# Patient Record
Sex: FEMALE | Race: WHITE | Employment: STUDENT | URBAN - METROPOLITAN AREA
[De-identification: names, ages, dates, MRNs, and addresses within clinical notes are randomized per-mention and may not be internally consistent; named-entity substitution may affect disease eponyms.]

---
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
## 2023-06-12 ENCOUNTER — TELEPHONE (OUTPATIENT)
Dept: ORTHOPEDIC SURGERY | Facility: CLINIC | Age: 18
End: 2023-06-12
Payer: COMMERCIAL

## 2023-06-12 NOTE — TELEPHONE ENCOUNTER
UNABLE TO WT CLINICAL  NON [facility]     Caller: ADRIEN LEE    Relationship to patient: PATIENT    Best call back number: [phone]    Chief complaint: DISLOCATION RIGHT KNEE     Type of visit: NEW PATIENT     Requested date: ASAP      If rescheduling, when is the original appointment: N/A     Additional notes:PATIENT SEEN AT [facility] 06/01/23 AFTER INJURING RIGHT KNEE WHILE PLAYING KICK BALL  PATIENT SEEN AT [facility] 06/01/23    REFERRAL IN SCHEDULE REVIEW

## 2023-06-13 NOTE — TELEPHONE ENCOUNTER
Patient has been scheduled with Dr. Mckeon on Friday, June 16th at our [place] location and is aware of this.

## 2023-06-16 ENCOUNTER — OFFICE VISIT (OUTPATIENT)
Dept: ORTHOPEDIC SURGERY | Facility: CLINIC | Age: 18
End: 2023-06-16
Payer: COMMERCIAL

## 2023-06-16 VITALS — BODY MASS INDEX: 22.79 KG/M2 | TEMPERATURE: 97.1 F | WEIGHT: 150.4 LBS | HEIGHT: 68 IN

## 2023-06-16 DIAGNOSIS — S83.004A DISLOCATION OF RIGHT PATELLA, INITIAL ENCOUNTER: ICD-10-CM

## 2023-06-16 DIAGNOSIS — M25.561 RIGHT KNEE PAIN, UNSPECIFIED CHRONICITY: Primary | ICD-10-CM

## 2023-06-16 RX ORDER — CETIRIZINE HYDROCHLORIDE 5 MG/1
5 TABLET ORAL DAILY
COMMUNITY

## 2023-06-16 NOTE — PROGRESS NOTES
New Knee      Patient: Sharron Benson        YOB: 2005    Medical Record Number: [id number]        Chief Complaints: Right knee pain      History of Present Illness: This is a 18-year-old female who presents following a June 1 episode where her patella dislocated she was playing kickball and really states she was straightforward running when her kneecap dislocated.  This happened 1 year ago where her patella dislocated she had an MRI at that time which did show a nondisplaced patella fracture.  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     No current facility-administered medications on file prior to visit.     Current Medications:  Scheduled Meds:  Continuous Infusions:No current facility-administered medications for this visit.    PRN Meds:.    Past Medical History:   Diagnosis Date    Dislocated patella, right, sequela 04/01/2022    Treated in [place]    Right patella fracture 04/01/2022    Treated in [place]      History reviewed. No pertinent surgical history.     Social History     Occupational History    Not on file   Tobacco Use    Smoking status: Never    Smokeless tobacco: Never   Vaping Use    Vaping Use: Never used   Substance and Sexual Activity    Alcohol use: Defer    Drug use: Never    Sexual activity: Defer      Social History     Social History Narrative    Not on file      History reviewed. No pertinent family history.          Review of Systems:     Review of Systems      Physical Exam: 18 y.o. female  General Appearance:    Alert, cooperative, in no acute distress                   Vitals:    06/16/23 1048   Temp: 97.1 °F (36.2 °C)   TempSrc: Temporal   Weight: 68.2 kg (150 lb  "6.4 oz)   Height: 171.5 cm (67.5\")   PainSc:   3      Patient is alert and read ×3 no acute distress appears her above-listed at height weight and age.  Affect is normal respiratory rate is normal unlabored. Heart rate regular rate rhythm, sclera, dentition and hearing are normal for the purpose of this exam.        Ortho Exam exam of her right knee she does have a moderate effusion she does have mobility of the patella but no true apprehension sign but pain with mobility she is limited in range of motion due to her swelling    Procedures             Radiology:   AP, Lateral and merchant views of the right knee  were ordered/reviewed to evauateknee pain.  I have no comparative film you do see some calcification/ossification of the medial aspect of patella patella appears to sit centrally and she has a reasonable trochlear groove.  She also has an MRI from 1 year ago which I reviewed in epic and she did have a nondisplaced patella fracture  Imaging Results (Most Recent)       Procedure Component Value Units Date/Time    XR Knee 3 View Right [[id number]] Resulted: 06/16/23 1040     Updated: 06/16/23 1041    Impression:      Ordering physician's impression is located in the Encounter Note dated 06/16/23. X-ray performed in the DR room.            Assessment/Plan:    Right knee patella dislocation I think at this point we do need to repeat her MRI to really look at the fracture but also decide if this is something surgical she would like to head back home if it surgical which I understand.  We talked about the things that she can change including position of her feet she needs some arch supports to prevent pes planus and also she needs to be sick have significant strength in her quads and core                               "

## 2024-01-29 ENCOUNTER — HOSPITAL ENCOUNTER (OUTPATIENT)
Dept: PHYSICAL THERAPY | Age: 19
Setting detail: THERAPIES SERIES
Discharge: HOME | End: 2024-01-29
Payer: COMMERCIAL

## 2024-01-29 DIAGNOSIS — M25.661 DECREASED RANGE OF MOTION (ROM) OF RIGHT KNEE: Primary | ICD-10-CM

## 2024-01-29 DIAGNOSIS — R29.898 DECREASED STRENGTH INVOLVING KNEE JOINT: ICD-10-CM

## 2024-01-29 DIAGNOSIS — M25.561 ACUTE PAIN OF RIGHT KNEE: ICD-10-CM

## 2024-01-29 PROCEDURE — 97161 PT EVAL LOW COMPLEX 20 MIN: CPT

## 2024-01-29 NOTE — PLAN OF CARE
body structures, functions (impairments), activity limitations, and/or participation restrictions  [x] A clinical presentation with evolving clinical presentation with changing characteristics  [x] Clinical decision making of LOW (73369 - Typically 20 minutes face-to-face) complexity using standardized patient assessment instrument and/or measurable assessment of functional outcome.    GOALS     Patient stated goal: Patient will return to swimming with no functional limitations and 0/10 at end of plan of care  Status: [] Progressing: [] Met: [x] Not Met: [] Adjusted    Therapist goals for Patient:   Short Term Goals: To be achieved in: 2 weeks  Independent in HEP and progression per patient tolerance, in order to progress toward full function and prevent re-injury.    Status: [] Progressing: [] Met: [x] Not Met: [] Adjusted  Patient will have a decrease in pain to 0/10 to help  facilitate improvement in movement, function, and ADLs as indicated by functional deficits.   Status: [] Progressing: [] Met: [x] Not Met: [] Adjusted    Long Term Goals: To be achieved in:  8  weeks  Disability index score of 10% or less for the LEFS to assist with return top prior level of function.    Status: [] Progressing: [] Met: [x] Not Met: [] Adjusted  Improve  knee AROM  Flexion to 143 degrees or  better to allow for proper joint functioning as indicated by patients functional deficits.  Status: [] Progressing: [] Met: [x] Not Met: [] Adjusted  Pt to improve strength to show motor control/activation of quadriceps to facilitate functional mobility and ADLs.   Status: [] Progressing: [] Met: [x] Not Met: [] Adjusted  Patient will return to Usual work, housework or activities, Usual recreational activities, and walk 1 mile without increased symptoms or restriction to work towards return to prior level of function.  Status: [] Progressing: [] Met: [x] Not Met: [] Adjusted  Patient will increase LE function to allow independence in all

## 2024-01-29 NOTE — FLOWSHEET NOTE
[facility] - [facility] Therapy [address] [place] [id number] office: [phone] fax: [phone]      Physical Therapy: TREATMENT/PROGRESS NOTE   Patient: Renetta Sutherland (18 y.o. female)   Treatment Date: 2024   :  2005 MRN: [id number]   Visit #: 1   Insurance Allowable Auth Needed   Medically necessary []Yes    [x]No    Insurance: Payor: [employer] / Plan: [employer] STUDENT [employer] / Product Type: *No Product type* /   Insurance ID: [id number] - (Commercial)  Secondary Insurance (if applicable):    Treatment Diagnosis:     ICD-10-CM    1. Decreased range of motion (ROM) of right knee  M25.661       2. Decreased strength involving knee joint  R29.898       3. Acute pain of right knee  M25.561          Medical Diagnosis:    Recurrent dislocation, unspecified knee [M24.469]   Referring Physician: Alfredo Wu MD  PCP: No primary care provider on file.                             Plan of care signed: NO    Date of Patient follow up with Physician:      Progress Report/POC: EVAL today  POC update due: (10 visits /OR AUTH LIMITS, whichever is less)  2024     Protocol for right knee arthroscopy with Tayler tibial tubercle osteotomy and medial patellofemoral ligament reconstruction. Patient cleared for full WB as tolerated    Preferred Language for Healthcare:   [x]English       []other:    SUBJECTIVE EXAMINATION     Patient Report/Comments: see eval     Test used Initial score  2024   Pain Summary VAS 3/10    Functional questionnaire LEFS 27/80    Other:                OBJECTIVE EXAMINATION     Observation: increased swelling in R knee compared to Left knee. Hypomobility noted in R knee for medial, superior, and inferior patellar glides    Test measurements: see eval    Exercises/Interventions:     Therapeutic Ex (27933)  resistance Sets/time Reps Notes/Cues/Progressions   Straight leg raise  1 10 HEP   Long arc quads  1 10 5 second hold, HEP

## 2024-02-01 ENCOUNTER — HOSPITAL ENCOUNTER (OUTPATIENT)
Dept: PHYSICAL THERAPY | Age: 19
Setting detail: THERAPIES SERIES
Discharge: HOME | End: 2024-02-01
Payer: COMMERCIAL

## 2024-02-01 PROCEDURE — 97110 THERAPEUTIC EXERCISES: CPT

## 2024-02-05 ENCOUNTER — HOSPITAL ENCOUNTER (OUTPATIENT)
Dept: PHYSICAL THERAPY | Age: 19
Setting detail: THERAPIES SERIES
Discharge: HOME | End: 2024-02-05
Payer: COMMERCIAL

## 2024-02-05 PROCEDURE — 97110 THERAPEUTIC EXERCISES: CPT

## 2024-02-05 PROCEDURE — 97112 NEUROMUSCULAR REEDUCATION: CPT

## 2024-02-05 NOTE — FLOWSHEET NOTE
[facility] - Outpatient Rehabilitation and Therapy [address] [place] [address] office: [phone] fax: [phone]      Physical Therapy: TREATMENT/PROGRESS NOTE   Patient: Renetta Sutherland (18 y.o. female)   Treatment Date: 2024   :  2005 MRN: [id number]   Visit #: 3   Insurance Allowable Auth Needed   Medically necessary []Yes    [x]No    Insurance: Payor: [employer] / Plan: [employer] STUDENT [employer] / Product Type: *No Product type* /   Insurance ID: [id number] - (Commercial)  Secondary Insurance (if applicable):    Treatment Diagnosis:   1. Decreased range of motion (ROM) of right knee  M25.661         2. Decreased strength involving knee joint  R29.898         3. Acute pain of right knee  M25.561         Medical Diagnosis:    Recurrent dislocation, unspecified knee [M24.469]    Referring Physician: Alfredo Wu MD  PCP: No primary care provider on file.                             Plan of care signed: NO    Date of Patient follow up with Physician:      Progress Report/POC: NO  POC update due: (10 visits /OR AUTH LIMITS, whichever is less)  3/6/2024     Protocol for right knee arthroscopy with Tayler tibial tubercle osteotomy and medial patellofemoral ligament reconstruction. Patient cleared for full WB as tolerated    Preferred Language for Healthcare:   [x]English       []other:    SUBJECTIVE EXAMINATION     Patient Report/Comments: patient reports some soreness on the over the patella with knee extension. Patient reports no other issues. Patient reports HEP compliance      Test used Initial score  2024   Pain Summary VAS 3/10 3/10 pre session, 4/10 after session   Functional questionnaire LEFS 27/80    Other:                OBJECTIVE EXAMINATION     Observation: patient displays increased neuromuscular control when walking without gait. Patient still displays overall lack of stability and control but is progressing    Test measurements: see

## 2024-02-08 ENCOUNTER — HOSPITAL ENCOUNTER (OUTPATIENT)
Dept: PHYSICAL THERAPY | Age: 19
Setting detail: THERAPIES SERIES
Discharge: HOME | End: 2024-02-08
Payer: COMMERCIAL

## 2024-02-08 PROCEDURE — 97140 MANUAL THERAPY 1/> REGIONS: CPT

## 2024-02-08 PROCEDURE — 97112 NEUROMUSCULAR REEDUCATION: CPT

## 2024-02-08 PROCEDURE — 97110 THERAPEUTIC EXERCISES: CPT

## 2024-02-08 NOTE — FLOWSHEET NOTE
[facility] - Outpatient Rehabilitation and Therapy [address] [place] [address] office: [phone] fax: [phone]      Physical Therapy: TREATMENT/PROGRESS NOTE   Patient: Renetta Sutherland (18 y.o. female)   Treatment Date: 2024   :  2005 MRN: [id number]   Visit #: 4   Insurance Allowable Auth Needed   Medically necessary []Yes    [x]No    Insurance: Payor: [employer] / Plan: [employer] [facility] HEALTH / Product Type: *No Product type* /   Insurance ID: [id number] - (Commercial)  Secondary Insurance (if applicable):    Treatment Diagnosis:   1. Decreased range of motion (ROM) of right knee  M25.661         2. Decreased strength involving knee joint  R29.898         3. Acute pain of right knee  M25.561         Medical Diagnosis:    Recurrent dislocation, unspecified knee [M24.469]    Referring Physician: Alfredo Wu MD  PCP: No primary care provider on file.                             Plan of care signed: NO    Date of Patient follow up with Physician:      Progress Report/POC: NO  POC update due: (10 visits /OR AUTH LIMITS, whichever is less)  3/8/2024     Protocol for right knee arthroscopy with Tayler tibial tubercle osteotomy and medial patellofemoral ligament reconstruction. Patient cleared for full WB as tolerated    Preferred Language for Healthcare:   [x]English       []other:    SUBJECTIVE EXAMINATION     Patient Report/Comments: patient reports some soreness over the medial knee with walking. Patient reports this occurs primarily after walking all day. Patient reports good tolerance with ambulation since the brace was unlocked to 50 degrees. Patient also reports increased stability with ambulation.     Test used Initial score  2024   Pain Summary  410   Functional questionnaire LEFS 27/80    Other:                OBJECTIVE EXAMINATION     Observation: patient displays decreased juarez outside of the brace with stance phase for the R lower extremity

## 2024-02-12 ENCOUNTER — HOSPITAL ENCOUNTER (OUTPATIENT)
Dept: PHYSICAL THERAPY | Age: 19
Setting detail: THERAPIES SERIES
Discharge: HOME | End: 2024-02-12
Payer: COMMERCIAL

## 2024-02-12 PROCEDURE — 97110 THERAPEUTIC EXERCISES: CPT

## 2024-02-12 PROCEDURE — 97112 NEUROMUSCULAR REEDUCATION: CPT

## 2024-02-12 NOTE — FLOWSHEET NOTE
Sets/time Reps Notes/Cues/Progressions   Bike  5     Straight leg raise       Long arc quads    STM applied between sets, decrease in symptoms noted after STM   Standing hip abduction  1 10 bilateral          Heel raises       BFR SLR    LOP: 120 mmHg, reassess next visit due to noisy signal   Leg press 60#, 70#, 80# 1 10 Double leg, R leg shifted up to bias R LE    Bench seat at 7   Single leg press 40# 2 10 Bench seat at 7   Knee extension machine isometric  10 10 sec hold     60 deg hold, 80 deg hold second   Knee extension machine    Starting at 60 degrees of flexion and trialled at 1st plate + 5lb and unable to perform   Education on HEP and plan of care  4            Manual Intervention (14392)  TIME     Patellar mobs    Inferior, superior, medial   STM    Medial knee effleurage & petrissage                         NMR re-education (35576) resistance Sets/time Reps CUES NEEDED   Weight shifts in standing       Pulsed mini lunges   10 10 sec holds    Mini squats without brace       TrX mini squats  10 10 sec holds Cued to keep within 45 degrees          Therapeutic Activity (61766)  Sets/time                                          Modalities:    Game Ready Vasopneumatic compression x10 minutes at 34 deg F to R knee    Education/Home Exercise Program: Patient HEP program created electronically.  Refer to [employer] access code: [email or web address]      ASSESSMENT     Today's Assessment:  Patient displays progress today with progression of ROM and exercises. Patient displays improved stability with ambulation and decreased antalgic gait pattern. Patient has good eccentric control with ambulation but still requires increased time to control lower extremity for terminal swing into initial contact and loading response. Patient's brace was adjusted to allow 70 degrees today with patient ambulating with brace and feeling stable with adjustments from 50 degrees of flexion. Patient was educated on plan of care and HEP with all

## 2024-02-16 ENCOUNTER — APPOINTMENT (OUTPATIENT)
Dept: PHYSICAL THERAPY | Age: 19
End: 2024-02-16
Payer: COMMERCIAL

## 2024-02-22 ENCOUNTER — HOSPITAL ENCOUNTER (OUTPATIENT)
Dept: PHYSICAL THERAPY | Age: 19
Setting detail: THERAPIES SERIES
Discharge: HOME | End: 2024-02-22
Payer: COMMERCIAL

## 2024-02-22 PROCEDURE — 97110 THERAPEUTIC EXERCISES: CPT

## 2024-02-22 PROCEDURE — 97112 NEUROMUSCULAR REEDUCATION: CPT

## 2024-02-22 NOTE — FLOWSHEET NOTE
Modalities:    No modalities applied this session    Education/Home Exercise Program: Patient HEP program created electronically.  Refer to [employer] access code: [email or web address]      ASSESSMENT     Today's Assessment:  Patient displays progress today with progression of exercises. Patient's HEP was updated to target quad eccentric control further. Patient's plan of care was also discussed in regards to targeting quad eccentric control and hip abduction strength further during sessions. Patient's brace was also unlocked to 120 degrees of flexion with patient trialling ambulation in brace with new range of motion. No symptoms reported with new range allowed. Patient educated on monitoring symptoms to assess tolerance. Patient continues to display deficits in NMR, strength, endurance, and stability and will benefit from continued skilled therapy to address deficits and progress towards goals.    Medical Necessity Documentation:  I certify that this patient meets the below criteria necessary for medical necessity for care and/or justification of therapy services:  The patient has functional impairments and/or activity limitations and would benefit from continued outpatient therapy services to address the deficits outlined in the patients goals    Treatment/Activity Tolerance:  [x] Patient tolerated treatment well [] Patient limited by fatique  [] Patient limited by pain  [] Patient limited by other medical complications  [] Other:     Return to Play: Stage 1: Intro to Strength    Prognosis for POC: [x] Good [] Fair  [] Poor    Patient requires continued skilled intervention: [x] Yes  [] No    GOALS     Patient stated goal: Patient will return to swimming with no functional limitations and 0/10 at end of plan of care  Status: [] Progressing: [] Met: [x] Not Met: [] Adjusted     Therapist goals for Patient:   Short Term Goals: To be achieved in: 2 weeks  Independent in HEP and progression per patient tolerance, in  No

## 2024-02-27 ENCOUNTER — APPOINTMENT (OUTPATIENT)
Dept: PHYSICAL THERAPY | Age: 19
End: 2024-02-27
Payer: COMMERCIAL